# Patient Record
Sex: MALE | Race: WHITE | Employment: UNEMPLOYED | URBAN - METROPOLITAN AREA
[De-identification: names, ages, dates, MRNs, and addresses within clinical notes are randomized per-mention and may not be internally consistent; named-entity substitution may affect disease eponyms.]

---
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
## 2024-09-03 NOTE — MR AVS SNAPSHOT
After Visit Summary   7/9/2018    Deandre Jesus    MRN: [id number]           Patient Information     Date Of Birth          2005        Visit Information        Provider Department      7/9/2018 4:00 PM Adwoa Hood MD [facility]        Today's Diagnoses     Acne vulgaris    -  1    Post-inflammatory pigmentary changes        Medication monitoring encounter           Follow-ups after your visit        Your next 10 appointments already scheduled     Aug 06, 2018  4:00 PM CDT   Return Visit with Adwoa Hood MD   [facility] ([facility])    [address]  [facility] [address] [phone]            Sep 10, 2018  4:00 PM CDT   Return Visit with Adwoa Hood MD   [facility] ([facility])    [address]  [facility] [address] [phone]              Who to contact     If you have questions or need follow up information about today's clinic visit or your schedule please contact [facility] directly at [phone].  Normal or non-critical lab and imaging results will be communicated to you by CareerImphart, letter or phone within 4 business days after the clinic has received the results. If you do not hear from us within 7 days, please contact the clinic through [email or web address] or phone. If you have a critical or abnormal lab result, we will notify you by phone as soon as possible.  Submit refill requests through Categorical or call your pharmacy and they will forward the refill request to us. Please allow 3 business days for your refill to be completed.          Additional Information About Your Visit        MyChart Information     Categorical lets you send messages to your doctor, view your test results, renew your prescriptions, schedule appointments and more. To sign up, go to [email or web address], contact your [place] clinic or call [phone]  Noted  Supportive care   "during business hours.            Care EveryWhere ID     This is your Care EveryWhere ID. This could be used by other organizations to access your [place] medical records  [id number]        Your Vitals Were     Pulse Temperature Height Pulse Oximetry BMI (Body Mass Index)       68 99.2  F (37.3  C) (Oral) 5' 6.5\" (168.9 cm) 98% 22.39 kg/m2        Blood Pressure from Last 3 Encounters:   07/09/18 117/60   06/04/18 110/56   04/03/18 132/70    Weight from Last 3 Encounters:   07/09/18 140 lb 12.8 oz (63.9 kg) (81 %)*   06/04/18 139 lb 9.6 oz (63.3 kg) (81 %)*   04/03/18 143 lb 1.3 oz (64.9 kg) (86 %)*     * Growth percentiles are based on [facility] 2-20 Years data.              We Performed the Following     CBC with platelets differential     Comprehensive metabolic panel     Lipid Profile          Today's Medication Changes          These changes are accurate as of 7/9/18 11:59 PM.  If you have any questions, ask your nurse or doctor.               These medicines have changed or have updated prescriptions.        Dose/Directions    * ISOtretinoin 30 MG Caps   This may have changed:  Another medication with the same name was added. Make sure you understand how and when to take each.   Used for:  Cystic acne   Changed by:  Adwoa Hood MD        Swallow 2 caps (60 mg) by mouth daily   Quantity:  60 capsule   Refills:  0       * ISOtretinoin 20 MG capsule   Commonly known as:  ACCUTANE   This may have changed:  Another medication with the same name was added. Make sure you understand how and when to take each.   Used for:  Acne vulgaris, Medication monitoring encounter   Changed by:  Adwoa Hood MD        Dose:  20 mg   Take 1 capsule (20 mg) by mouth daily   Quantity:  30 capsule   Refills:  0       * ISOtretinoin 40 MG capsule   Commonly known as:  ACCUTANE   This may have changed:  Another medication with the same name was added. Make sure you understand how and when to take each.   Used for:  Acne " vulgaris   Changed by:  Adwoa Hood MD        Dose:  40 mg   Take 1 capsule (40 mg) by mouth daily   Quantity:  30 capsule   Refills:  0       * ISOtretinoin 30 MG Caps   This may have changed:  Another medication with the same name was added. Make sure you understand how and when to take each.   Used for:  Acne vulgaris   Changed by:  Adwoa Hood MD        Dose:  60 mg   Take 60 mg by mouth daily   Quantity:  60 capsule   Refills:  0       * ISOtretinoin 40 MG capsule   Commonly known as:  ACCUTANE   This may have changed:  You were already taking a medication with the same name, and this prescription was added. Make sure you understand how and when to take each.   Used for:  Acne vulgaris   Changed by:  Adwoa Hood MD        Dose:  80 mg   Take 2 capsules (80 mg) by mouth daily   Quantity:  60 capsule   Refills:  0       * Notice:  This list has 5 medication(s) that are the same as other medications prescribed for you. Read the directions carefully, and ask your doctor or other care provider to review them with you.         Where to get your medicines      These medications were sent to [id number] - [place], [place] - [address]  [address] [facility] [address]     Phone:  [phone]     ISOtretinoin 40 MG capsule                Primary Care Provider Office Phone # Fax #    Piotr Leal [phone]       [facility] PEDIATRICS [address] [facility] [address]  [facility] [address]        Equal Access to Services     [facility]: Hadii rere hill hadasho [place]ie, waaxda luqadaha, qaybta kaalmada adeegyada, klever parsons. So [facility] [phone].    ATENCIÓN: Si habla español, tiene a smyth disposición servicios gratuitos de asistencia lingüística. Llame al [phone].    We comply with applicable federal civil rights laws and [place] laws. We do not discriminate on the basis of race, color, national origin, age,  disability, sex, sexual orientation, or gender identity.            Thank you!     Thank you for choosing [facility]  for your care. Our goal is always to provide you with excellent care. Hearing back from our patients is one way we can continue to improve our services. Please take a few minutes to complete the written survey that you may receive in the mail after your visit with us. Thank you!             Your Updated Medication List - Protect others around you: Learn how to safely use, store and throw away your medicines at www.disposemymeds.org.          This list is accurate as of 7/9/18 11:59 PM.  Always use your most recent med list.                   Brand Name Dispense Instructions for use Diagnosis    * ISOtretinoin 30 MG Caps     60 capsule    Swallow 2 caps (60 mg) by mouth daily    Cystic acne       * ISOtretinoin 20 MG capsule    ACCUTANE    30 capsule    Take 1 capsule (20 mg) by mouth daily    Acne vulgaris, Medication monitoring encounter       * ISOtretinoin 40 MG capsule    ACCUTANE    30 capsule    Take 1 capsule (40 mg) by mouth daily    Acne vulgaris       * ISOtretinoin 30 MG Caps     60 capsule    Take 60 mg by mouth daily    Acne vulgaris       * ISOtretinoin 40 MG capsule    ACCUTANE    60 capsule    Take 2 capsules (80 mg) by mouth daily    Acne vulgaris       * Notice:  This list has 5 medication(s) that are the same as other medications prescribed for you. Read the directions carefully, and ask your doctor or other care provider to review them with you.